# Patient Record
Sex: MALE | Race: BLACK OR AFRICAN AMERICAN
[De-identification: names, ages, dates, MRNs, and addresses within clinical notes are randomized per-mention and may not be internally consistent; named-entity substitution may affect disease eponyms.]

---

## 2017-03-25 NOTE — PHYS DOC
Past Medical History


Past Medical History:  Asthma, Other


Additional Past Medical Histor:  Tourette syndrome, scoliosis


Past Surgical History:  No Surgical History


Alcohol Use:  None


Drug Use:  None





Adult General


Chief Complaint


Chief Complaint:  SEXUALLY TRANSMITTED DISEASE





HPI


HPI


Patient is a 21  year old male who presents today for STD treatment. The 

girlfriend was in the ED earlier on today and was treated for STDs as well. 

Patient has no symptoms.





Review of Systems


Review of Systems





Constitutional: Denies fever or chills []


Eyes: Denies change in visual acuity, redness, or eye pain []


: STD treatment


Musculoskeletal: Denies back pain or joint pain []


Integument: Denies rash or skin lesions []


Neurologic: Denies headache, focal weakness or sensory changes []


Endocrine: Denies polyuria or polydipsia []





Current Medications


Current Medications








 Current Medications








 Medications


  (Trade)  Dose


 Ordered  Sig/Nilesh  Start Time


 Stop Time Status Last Admin


Dose Admin


 


 Azithromycin


  (Zithromax)  1,000 mg  1X  ONCE  3/25/17 21:00


 3/25/17 21:01   


 


 


 Ceftriaxone Sodium


  (Rocephin Im)  250 mg  1X  ONCE  3/25/17 21:00


 3/25/17 21:01   


 


 


 Metronidazole


  (Flagyl)  2,000 mg  1X  ONCE  3/25/17 21:00


 3/25/17 21:01   


 














Allergies


Allergies





 Allergies








Coded Allergies Type Severity Reaction Last Updated Verified


 


  milk Allergy Mild GI upset 10/26/14 Yes


 


  No Known Medication Allergies Allergy Unknown  10/26/14 Yes











Physical Exam


Physical Exam





Constitutional: Well developed, well nourished, no acute distress, non-toxic 

appearance. []


HENT: Normocephalic, atraumatic, bilateral external ears normal, oropharynx 

moist, no oral exudates, nose normal. []


Abdomen: Bowel sounds normal, soft, no tenderness, no masses, no pulsatile 

masses. [] 


Skin: Warm, dry, no erythema, no rash. [] 


Back: No tenderness, no CVA tenderness. [] 


Extremities: No tenderness, no cyanosis, no clubbing, ROM intact, no edema. [] 


Neurologic: Alert and oriented X 3, normal motor function, normal sensory 

function, no focal deficits noted. []


Psychologic: Affect normal, judgement normal, mood normal. []





EKG


EKG


[]





Radiology/Procedures


Radiology/Procedures


[]





Course & Med Decision Making


Course & Med Decision Making


Pertinent Labs and Imaging studies reviewed. (See chart for details)





Patient is in the ED for STD treatment. He was given Rocephin and azithromycin 

and Flagyl. Follow-up with his own doctor in one to 2 weeks. Educated on safe 

sex practices especially the need to use protection.





Dragon Disclaimer


Dragon Disclaimer


This electronic medical record was generated, in whole or in part, using a 

voice recognition dictation system.





Departure


Departure


Impression:  


 Primary Impression:  


 Concern about STD in male without diagnosis


Disposition:  01 HOME, SELF-CARE


Condition:  STABLE


Referrals:  


RAUL JARA MD (PCP)


follow up with your doctor in one week


Patient Instructions:  Sexually Transmitted Disease, Easy-to-Read





Additional Instructions:


You were treated in the Ed for common STDs including Trichomonas chlamydia and 

gonorrhea. Please contact all your sex partners, let them know you were treated 

and ask them to seek treatment too. Use protection at all times.








MITCH MARTINEZ Mar 25, 2017 20:53

## 2020-06-09 ENCOUNTER — HOSPITAL ENCOUNTER (EMERGENCY)
Dept: HOSPITAL 61 - ER | Age: 25
Discharge: HOME | End: 2020-06-09
Payer: MEDICAID

## 2020-06-09 VITALS — SYSTOLIC BLOOD PRESSURE: 133 MMHG | DIASTOLIC BLOOD PRESSURE: 72 MMHG

## 2020-06-09 VITALS — HEIGHT: 71 IN | BODY MASS INDEX: 19.75 KG/M2 | WEIGHT: 141.1 LBS

## 2020-06-09 DIAGNOSIS — Z91.011: ICD-10-CM

## 2020-06-09 DIAGNOSIS — J45.909: ICD-10-CM

## 2020-06-09 DIAGNOSIS — K02.9: ICD-10-CM

## 2020-06-09 DIAGNOSIS — M54.5: ICD-10-CM

## 2020-06-09 DIAGNOSIS — M79.641: ICD-10-CM

## 2020-06-09 DIAGNOSIS — F95.2: ICD-10-CM

## 2020-06-09 DIAGNOSIS — F17.200: ICD-10-CM

## 2020-06-09 DIAGNOSIS — M54.2: ICD-10-CM

## 2020-06-09 DIAGNOSIS — G89.29: Primary | ICD-10-CM

## 2020-06-09 PROCEDURE — 99283 EMERGENCY DEPT VISIT LOW MDM: CPT

## 2020-06-09 NOTE — PHYS DOC
Past Medical History


Past Medical History:  Asthma, Other


Additional Past Medical Histor:  Tourette syndrome, scoliosis, Cronic back pain.


Past Surgical History:  No Surgical History


Smoking Status:  Current Every Day Smoker


Alcohol Use:  None


Drug Use:  Marijuana





General Adult


EDM:


Chief Complaint:  BACK PAIN - NO INJURY





HPI:


HPI:





Patient is a 25  year old male who presents with complaint of neck and lower 

back pain that has been present for the majority of his life and states that it 

was exacerbated during his time in penit[facility]iary due to the hard box.  Patient denies any

recent injuries.  He states that it hurts all the time.  He also indicates that 

he has a feeling on the right-hand side that had come out about 18 months ago 

while he was in penitentiary and has not seen a dentist yet.  He states that this mor

shailesh the pain had gotten worse and is mouth and wanted to get that checked out 

as well.  He denies any fever.  He denies any urinary discomfort.  []





Review of Systems:


Review of Systems:


Constitutional:  Denies fever or chills. []


Respiratory:  Denies cough or shortness of breath. [] 


Cardiovascular:  Denies chest pain or edema. [] 


Musculoskeletal: Complains of back and neck pain. [] 


Integument:  Denies rash. [] 


Neurologic:  Denies headache, focal weakness or sensory changes. []





Heart Score:


Risk Factors:


Risk Factors:  DM, Current or recent (<one month) smoker, HTN, HLP, family 

history of CAD, obesity.


Risk Scores:


Score 0 - 3:  2.5% MACE over next 6 weeks - Discharge Home


Score 4 - 6:  20.3% MACE over next 6 weeks - Admit for Clinical Observation


Score 7 - 10:  72.7% MACE over next 6 weeks - Early Invasive Strategies





Allergies:


Allergies:





Allergies








Coded Allergies Type Severity Reaction Last Updated Verified


 


  milk Allergy Mild GI upset 10/26/14 Yes


 


  No Known Medication Allergies Allergy Unknown  10/26/14 Yes











Physical Exam:


PE:





Constitutional: Well developed, well nourished, no acute distress, non-toxic 

appearance. []


HENT: Normocephalic, atraumatic, bilateral external ears normal, right lower 

first molar has partially avulsed filling with exposed tooth with significant 

caries. []


Neck: Normal range of motion, no tenderness, supple, no stridor. [] 


Cardiovascular: Regular rate and rhythm []


Lungs & Thorax:  Bilateral breath sounds clear to auscultation []


Extremities: No tenderness, no cyanosis, no clubbing, ROM intact, no edema. [] 


Neurologic: Alert and oriented X 3, no focal deficits noted. []





Current Patient Data:


Vital Signs:





                                   Vital Signs








  Date Time  Temp Pulse Resp B/P (MAP) Pulse Ox O2 Delivery O2 Flow Rate FiO2


 


6/9/20 20:48 98.5 64 18 133/72 (92) 99 Room Air  





 98.5       











EKG:


EKG:


[]





Radiology/Procedures:


Radiology/Procedures:


[]





Course & Med Decision Making:


Course & Med Decision Making


Pertinent Labs and Imaging studies reviewed. (See chart for details)





[]





Dragon Disclaimer:


Dragon Disclaimer:


This electronic medical record was generated, in whole or in part, using a voice

 recognition dictation system.





Departure


Departure


Impression:  


   Primary Impression:  


   Chronic back pain


   Qualified Codes:  M54.5 - Low back pain; G89.29 - Other chronic pain


   Additional Impression:  


   Pain due to dental caries


Disposition:  01 HOME, SELF-CARE


Condition:  STABLE


Referrals:  


RAUL JARA MD (PCP)


Patient Instructions:  Chronic Back Pain, Dental Caries, Dental Pain


Scripts


Tramadol Hcl (TRAMADOL HCL) 50 Mg Tablet


50 MG PO Q6HRS PRN for PAIN, #10 TAB


   Prov: TARIK SHERIDAN Jr. DO         6/9/20 


Orphenadrine Citrate (ORPHENADRINE CITRATE) 100 Mg Tablet.er


1 TAB PO BID PRN for MUSCLE SPASMS, #14 TAB


   Prov: TARIK SHERIDAN Jr. DO         6/9/20 


Diclofenac Sodium (DICLOFENAC SODIUM) 50 Mg Tablet.dr


1 TAB PO BID PRN for PAIN, #20 TAB


   Prov: TARIK SHERIDAN Jr. DO         6/9/20





Justicifation of Admission Dx:


Justifications for Admission:


Justification of Admission Dx:  N/A











TARIK SHERIDAN Jr. DO           Jun 9, 2020 23:01